# Patient Record
Sex: MALE | Race: WHITE | NOT HISPANIC OR LATINO
[De-identification: names, ages, dates, MRNs, and addresses within clinical notes are randomized per-mention and may not be internally consistent; named-entity substitution may affect disease eponyms.]

---

## 2017-01-04 ENCOUNTER — APPOINTMENT (OUTPATIENT)
Dept: SPEECH THERAPY | Facility: CLINIC | Age: 72
End: 2017-01-04

## 2017-01-04 ENCOUNTER — OUTPATIENT (OUTPATIENT)
Dept: OUTPATIENT SERVICES | Facility: HOSPITAL | Age: 72
LOS: 1 days | Discharge: HOME | End: 2017-01-04

## 2017-01-04 DIAGNOSIS — Z95.5 PRESENCE OF CORONARY ANGIOPLASTY IMPLANT AND GRAFT: Chronic | ICD-10-CM

## 2017-01-09 DIAGNOSIS — H90.3 SENSORINEURAL HEARING LOSS, BILATERAL: ICD-10-CM

## 2017-01-23 ENCOUNTER — APPOINTMENT (OUTPATIENT)
Dept: PHARMACY | Facility: CLINIC | Age: 72
End: 2017-01-23

## 2017-01-30 ENCOUNTER — APPOINTMENT (OUTPATIENT)
Dept: PHARMACY | Facility: CLINIC | Age: 72
End: 2017-01-30

## 2021-12-18 ENCOUNTER — EMERGENCY (EMERGENCY)
Facility: HOSPITAL | Age: 76
LOS: 1 days | Discharge: HOME | End: 2021-12-18
Attending: EMERGENCY MEDICINE | Admitting: EMERGENCY MEDICINE
Payer: MEDICARE

## 2021-12-18 VITALS
RESPIRATION RATE: 16 BRPM | SYSTOLIC BLOOD PRESSURE: 124 MMHG | TEMPERATURE: 98 F | DIASTOLIC BLOOD PRESSURE: 75 MMHG | HEART RATE: 65 BPM | OXYGEN SATURATION: 98 %

## 2021-12-18 VITALS
HEART RATE: 71 BPM | HEIGHT: 68 IN | TEMPERATURE: 98 F | RESPIRATION RATE: 18 BRPM | WEIGHT: 175.05 LBS | SYSTOLIC BLOOD PRESSURE: 118 MMHG | OXYGEN SATURATION: 96 % | DIASTOLIC BLOOD PRESSURE: 70 MMHG

## 2021-12-18 DIAGNOSIS — Z95.5 PRESENCE OF CORONARY ANGIOPLASTY IMPLANT AND GRAFT: Chronic | ICD-10-CM

## 2021-12-18 LAB
ALBUMIN SERPL ELPH-MCNC: 3.8 G/DL — SIGNIFICANT CHANGE UP (ref 3.3–5)
ALP SERPL-CCNC: 59 U/L — SIGNIFICANT CHANGE UP (ref 40–120)
ALT FLD-CCNC: 34 U/L — SIGNIFICANT CHANGE UP (ref 12–78)
ANION GAP SERPL CALC-SCNC: 5 MMOL/L — SIGNIFICANT CHANGE UP (ref 5–17)
APPEARANCE UR: CLEAR — SIGNIFICANT CHANGE UP
AST SERPL-CCNC: 24 U/L — SIGNIFICANT CHANGE UP (ref 15–37)
BASOPHILS # BLD AUTO: 0.06 K/UL — SIGNIFICANT CHANGE UP (ref 0–0.2)
BASOPHILS NFR BLD AUTO: 0.8 % — SIGNIFICANT CHANGE UP (ref 0–2)
BILIRUB SERPL-MCNC: 0.6 MG/DL — SIGNIFICANT CHANGE UP (ref 0.2–1.2)
BILIRUB UR-MCNC: NEGATIVE — SIGNIFICANT CHANGE UP
BUN SERPL-MCNC: 24 MG/DL — HIGH (ref 7–23)
CALCIUM SERPL-MCNC: 9.1 MG/DL — SIGNIFICANT CHANGE UP (ref 8.5–10.1)
CHLORIDE SERPL-SCNC: 109 MMOL/L — HIGH (ref 96–108)
CO2 SERPL-SCNC: 26 MMOL/L — SIGNIFICANT CHANGE UP (ref 22–31)
COLOR SPEC: YELLOW — SIGNIFICANT CHANGE UP
CREAT SERPL-MCNC: 1.1 MG/DL — SIGNIFICANT CHANGE UP (ref 0.5–1.3)
DIFF PNL FLD: ABNORMAL
EOSINOPHIL # BLD AUTO: 0.25 K/UL — SIGNIFICANT CHANGE UP (ref 0–0.5)
EOSINOPHIL NFR BLD AUTO: 3.3 % — SIGNIFICANT CHANGE UP (ref 0–6)
GLUCOSE SERPL-MCNC: 145 MG/DL — HIGH (ref 70–99)
GLUCOSE UR QL: NEGATIVE — SIGNIFICANT CHANGE UP
HCT VFR BLD CALC: 40.2 % — SIGNIFICANT CHANGE UP (ref 39–50)
HGB BLD-MCNC: 14.4 G/DL — SIGNIFICANT CHANGE UP (ref 13–17)
IMM GRANULOCYTES NFR BLD AUTO: 0.9 % — SIGNIFICANT CHANGE UP (ref 0–1.5)
KETONES UR-MCNC: NEGATIVE — SIGNIFICANT CHANGE UP
LEUKOCYTE ESTERASE UR-ACNC: ABNORMAL
LIDOCAIN IGE QN: 211 U/L — SIGNIFICANT CHANGE UP (ref 73–393)
LYMPHOCYTES # BLD AUTO: 1.9 K/UL — SIGNIFICANT CHANGE UP (ref 1–3.3)
LYMPHOCYTES # BLD AUTO: 25.1 % — SIGNIFICANT CHANGE UP (ref 13–44)
MCHC RBC-ENTMCNC: 33.2 PG — SIGNIFICANT CHANGE UP (ref 27–34)
MCHC RBC-ENTMCNC: 35.8 GM/DL — SIGNIFICANT CHANGE UP (ref 32–36)
MCV RBC AUTO: 92.6 FL — SIGNIFICANT CHANGE UP (ref 80–100)
MONOCYTES # BLD AUTO: 0.71 K/UL — SIGNIFICANT CHANGE UP (ref 0–0.9)
MONOCYTES NFR BLD AUTO: 9.4 % — SIGNIFICANT CHANGE UP (ref 2–14)
NEUTROPHILS # BLD AUTO: 4.58 K/UL — SIGNIFICANT CHANGE UP (ref 1.8–7.4)
NEUTROPHILS NFR BLD AUTO: 60.5 % — SIGNIFICANT CHANGE UP (ref 43–77)
NITRITE UR-MCNC: NEGATIVE — SIGNIFICANT CHANGE UP
NRBC # BLD: 0 /100 WBCS — SIGNIFICANT CHANGE UP (ref 0–0)
PH UR: 5 — SIGNIFICANT CHANGE UP (ref 5–8)
PLATELET # BLD AUTO: 157 K/UL — SIGNIFICANT CHANGE UP (ref 150–400)
POTASSIUM SERPL-MCNC: 3.9 MMOL/L — SIGNIFICANT CHANGE UP (ref 3.5–5.3)
POTASSIUM SERPL-SCNC: 3.9 MMOL/L — SIGNIFICANT CHANGE UP (ref 3.5–5.3)
PROT SERPL-MCNC: 7.4 G/DL — SIGNIFICANT CHANGE UP (ref 6–8.3)
PROT UR-MCNC: 15
RBC # BLD: 4.34 M/UL — SIGNIFICANT CHANGE UP (ref 4.2–5.8)
RBC # FLD: 12.8 % — SIGNIFICANT CHANGE UP (ref 10.3–14.5)
SODIUM SERPL-SCNC: 140 MMOL/L — SIGNIFICANT CHANGE UP (ref 135–145)
SP GR SPEC: 1.02 — SIGNIFICANT CHANGE UP (ref 1.01–1.02)
UROBILINOGEN FLD QL: NEGATIVE — SIGNIFICANT CHANGE UP
WBC # BLD: 7.57 K/UL — SIGNIFICANT CHANGE UP (ref 3.8–10.5)
WBC # FLD AUTO: 7.57 K/UL — SIGNIFICANT CHANGE UP (ref 3.8–10.5)

## 2021-12-18 PROCEDURE — 81001 URINALYSIS AUTO W/SCOPE: CPT

## 2021-12-18 PROCEDURE — 99284 EMERGENCY DEPT VISIT MOD MDM: CPT

## 2021-12-18 PROCEDURE — 83690 ASSAY OF LIPASE: CPT

## 2021-12-18 PROCEDURE — 85025 COMPLETE CBC W/AUTO DIFF WBC: CPT

## 2021-12-18 PROCEDURE — 74176 CT ABD & PELVIS W/O CONTRAST: CPT | Mod: 26,MA

## 2021-12-18 PROCEDURE — 74176 CT ABD & PELVIS W/O CONTRAST: CPT | Mod: MA

## 2021-12-18 PROCEDURE — 99284 EMERGENCY DEPT VISIT MOD MDM: CPT | Mod: 25

## 2021-12-18 PROCEDURE — 80053 COMPREHEN METABOLIC PANEL: CPT

## 2021-12-18 PROCEDURE — 36415 COLL VENOUS BLD VENIPUNCTURE: CPT

## 2021-12-18 NOTE — ED PROVIDER NOTE - PHYSICAL EXAMINATION

## 2021-12-18 NOTE — ED PROVIDER NOTE - PATIENT PORTAL LINK FT
You can access the FollowMyHealth Patient Portal offered by [facility] by registering at the following website: [email or web address]. By joining [email or web address]’s FollowMyHealth portal, you will also be able to view your health information using other applications (apps) compatible with our system.

## 2021-12-18 NOTE — ED PROVIDER NOTE - NSICDXPASTMEDICALHX_GEN_ALL_CORE_FT
PAST MEDICAL HISTORY:  CAD (coronary artery disease)     Hypertension     Stented coronary artery

## 2021-12-18 NOTE — ED PROVIDER NOTE - CLINICAL SUMMARY MEDICAL DECISION MAKING FREE TEXT BOX
sent by urgent care due to right flank pain x 1 day. Pt took two Advil PTA. pt was advised to come for a CT scan to rule out kidney stones. plan includes labs, UA r/o UTI, CT renal stone hunt, re-assess

## 2021-12-18 NOTE — ED PROVIDER NOTE - CARE PROVIDER_API CALL
Srinivas Lee  UROLOGY  [address] [facility] [address], [address]  [place] [address]  Phone: [phone]  Fax: [phone]  Follow Up Time: 1-3 Days    Kyrie Villalobos  Orthopedics  [address], [address] [place] [address]  Phone: [phone]  Fax: [phone]  Follow Up Time: 1-3 Days

## 2021-12-18 NOTE — ED PROVIDER NOTE - OBJECTIVE STATEMENT
77 y/o male with PMHx HTN, HLD, and CAD sent by urgent care due to right flank pain x 1 day. pt describes pain as aching, right lower abdomen radiating around flank, and currently 6/10. Reports acute onset of pain, was 8/10 prior. Pt took two Advil PTA. pt was advised to come for a CT scan to rule out kidney stones. pt denies fever, vomiting, trauma/fall, incontinence, numbness/weakness, dysuria, hematuria, cp, sob, or any other complaints.   PCP Mehnaz

## 2021-12-18 NOTE — ED PROVIDER NOTE - PROVIDER TOKENS
PROVIDER:[TOKEN:[[id number]],FOLLOWUP:[1-3 Days]],PROVIDER:[TOKEN:[[id number]],FOLLOWUP:[1-3 Days]]

## 2021-12-18 NOTE — ED PROVIDER NOTE - NSFOLLOWUPINSTRUCTIONS_ED_ALL_ED_FT
Follow up with your primary care doctor. Return for any worsening pain, fever, vomiting, bloody urine, chest pain, etc.     Flank Pain, Adult    Flank pain is pain that is located on the side of the body between the upper abdomen and the back. This area is called the flank. The pain may occur over a short period of time (acute), or it may be long-term or recurring (chronic). It may be mild or severe. Flank pain can be caused by many things, including:  •Muscle soreness or injury.      •Kidney stones or kidney disease.      •Stress.      •A disease of the spine (vertebral disk disease).      •A lung infection (pneumonia).      •Fluid around the lungs (pulmonary edema).      •A skin rash caused by the chickenpox virus (shingles).      •Tumors that affect the back of the abdomen.      •Gallbladder disease.        Follow these instructions at home:     •Drink enough fluid to keep your urine clear or pale yellow.      •Rest as told by your health care provider.      •Take over-the-counter and prescription medicines only as told by your health care provider.      •Keep a journal to track what has caused your flank pain and what has made it feel better.      •Keep all follow-up visits as told by your health care provider. This is important.        Contact a health care provider if:    •Your pain is not controlled with medicine.      •You have new symptoms.      •Your pain gets worse.      •You have a fever.      •Your symptoms last longer than 2–3 days.      •You have trouble urinating or you are urinating very frequently.        Get help right away if:    •You have trouble breathing or you are short of breath.      •Your abdomen hurts or it is swollen or red.      •You have nausea or vomiting.      •You feel faint or you pass out.      •You have blood in your urine.        Summary    •Flank pain is pain that is located on the side of the body between the upper abdomen and the back.      •The pain may occur over a short period of time (acute), or it may be long-term or recurring (chronic). It may be mild or severe.      •Flank pain can be caused by many things.      •Contact your health care provider if your symptoms get worse or they last longer than 2–3 days.      This information is not intended to replace advice given to you by your health care provider. Make sure you discuss any questions you have with your health care provider.

## 2021-12-18 NOTE — ED PROVIDER NOTE - ATTENDING CONTRIBUTION TO CARE
76 male sent to ER from urgent care states this morning he had severe right flank pain radiating to right groin, no vomiting, could not find comfortable position, took 2 advil with some relief of pain, able to urinate well, no hematuria, no fever, no vomiting, patient alert and oriented, no acute distress, able to ambulate with out assistance, abdomen soft, non tender, f/u labs, ua, ct abdomen/pelvis, re-eval.

## 2023-02-01 ENCOUNTER — APPOINTMENT (OUTPATIENT)
Dept: ORTHOPEDIC SURGERY | Facility: CLINIC | Age: 78
End: 2023-02-01
Payer: MEDICARE

## 2023-02-01 VITALS — BODY MASS INDEX: 28.09 KG/M2 | WEIGHT: 179 LBS | HEIGHT: 67 IN

## 2023-02-01 DIAGNOSIS — Z78.9 OTHER SPECIFIED HEALTH STATUS: ICD-10-CM

## 2023-02-01 DIAGNOSIS — Z86.79 PERSONAL HISTORY OF OTHER DISEASES OF THE CIRCULATORY SYSTEM: ICD-10-CM

## 2023-02-01 DIAGNOSIS — Z86.39 PERSONAL HISTORY OF OTHER ENDOCRINE, NUTRITIONAL AND METABOLIC DISEASE: ICD-10-CM

## 2023-02-01 PROCEDURE — 99214 OFFICE O/P EST MOD 30 MIN: CPT

## 2023-02-01 PROCEDURE — 73562 X-RAY EXAM OF KNEE 3: CPT | Mod: LT

## 2023-02-01 NOTE — HISTORY OF PRESENT ILLNESS
[Gradual] : gradual [8] : 8 [Dull/Aching] : dull/aching [Intermittent] : intermittent [Walking] : walking [Lying in bed] : lying in bed [Retired] : Work status: retired [de-identified] : The patient has had increased pain left knee over the past several weeks.  He says this started after he had a vascular evaluation which was normal.  He has mild to moderate pain standing and walking.  Pain with stairs and movie sign.  He did have good improvement after previous Euflexxa injections left knee in 2021 [] : Post Surgical Visit: no [FreeTextEntry7] : L Leg [de-identified] : 11/4/21 [de-identified] : Dr. Arroyo

## 2023-02-01 NOTE — DISCUSSION/SUMMARY
[de-identified] : The patient would like to repeat Euflexxa injections left knee.  Risks benefits and the alternatives were discussed\par Ice p.r.n.\par Tylenol p.r.n.\par Continue home exercises\par \par Impression:\par Moderate to severe osteoarthritis left knee

## 2023-02-01 NOTE — IMAGING
[de-identified] : Mild left antalgic gait\par \par Left knee\par No swelling\par Mild medial facet and joint line tenderness\par Passive range of motion 0° to 125°\par Ligaments are stable\par Quad strength 5/5\par \par Left leg\par No swelling\par Calf is soft and nontender\par Dorsalis pedis pulse 2+ [Left] : left knee [FreeTextEntry9] : Reviewed and interpreted.  Left knee AP standing, lateral, sunrise views-moderate to severe degenerative changes with narrowing the medial compartment on AP standing view, spurring patellofemoral joint

## 2023-02-08 ENCOUNTER — APPOINTMENT (OUTPATIENT)
Dept: ORTHOPEDIC SURGERY | Facility: CLINIC | Age: 78
End: 2023-02-08
Payer: MEDICARE

## 2023-02-08 VITALS — BODY MASS INDEX: 28.09 KG/M2 | HEIGHT: 67 IN | WEIGHT: 179 LBS

## 2023-02-08 PROCEDURE — 20611 DRAIN/INJ JOINT/BURSA W/US: CPT | Mod: LT

## 2023-02-08 RX ORDER — HYALURONATE SODIUM 20 MG/2 ML
20 SYRINGE (ML) INTRAARTICULAR
Refills: 0 | Status: COMPLETED | OUTPATIENT
Start: 2023-02-08

## 2023-02-08 RX ADMIN — Medication MG/2ML: at 00:00

## 2023-02-08 NOTE — HISTORY OF PRESENT ILLNESS
[Gradual] : gradual [3] : 3 [Dull/Aching] : dull/aching [Intermittent] : intermittent [Leisure] : leisure [Rest] : rest [Walking] : walking [Stairs] : stairs [Retired] : Work status: retired [1] : 1 [Euflexxa] : Euflexxa [de-identified] : The patient has continued mild to moderate pain left knee standing and walking.  Pain after sitting and getting up.  Seen today with his wife, Amaya [] : Post Surgical Visit: no

## 2023-02-08 NOTE — IMAGING
[de-identified] : Mild left antalgic gait\par \par Left knee\par No swelling\par Mild medial facet and joint line tenderness\par Passive range of motion 0° to 125°\par \par Left leg\par No swelling\par Calf is soft and nontender\par

## 2023-02-08 NOTE — DISCUSSION/SUMMARY
[de-identified] : The patient would like to repeat Euflexxa injections left knee.  Risks benefits and the alternatives were discussed\par Ice p.r.n.\par Tylenol p.r.n.\par Continue home exercises\par \par Impression:\par Moderate to severe osteoarthritis left knee

## 2023-02-08 NOTE — PROCEDURE
[Large Joint Injection] : Large joint injection [Left] : of the left [Knee] : knee [Pain] : pain [Alcohol] : alcohol [Betadine] : betadine [Ethyl Chloride sprayed topically] : ethyl chloride sprayed topically [Sterile technique used] : sterile technique used [Euflexxa(20mg)] : 20mg of Euflexxa [#1] : series #1 [] : Patient tolerated procedure well [Patient was advised to rest the joint(s) for ____ days] : patient was advised to rest the joint(s) for [unfilled] days [Risks, benefits, alternatives discussed / Verbal consent obtained] : the risks benefits, and alternatives have been discussed, and verbal consent was obtained [Altered anatomic landmarks d/t erosive arthritis] : altered anatomic landmarks d/t erosive arthritis [All ultrasound images have been permanently captured and stored accordingly in our picture archiving and communication system] : All ultrasound images have been permanently captured and stored accordingly in our picture archiving and communication system [FreeTextEntry3] : Do not submerge underwater for 24 hours

## 2023-02-13 ENCOUNTER — APPOINTMENT (OUTPATIENT)
Dept: ORTHOPEDIC SURGERY | Facility: CLINIC | Age: 78
End: 2023-02-13
Payer: MEDICARE

## 2023-02-13 VITALS — HEIGHT: 67 IN | WEIGHT: 179 LBS | BODY MASS INDEX: 28.09 KG/M2

## 2023-02-13 DIAGNOSIS — S16.1XXA STRAIN OF MUSCLE, FASCIA AND TENDON AT NECK LEVEL, INITIAL ENCOUNTER: ICD-10-CM

## 2023-02-13 PROCEDURE — 72040 X-RAY EXAM NECK SPINE 2-3 VW: CPT

## 2023-02-13 PROCEDURE — 73030 X-RAY EXAM OF SHOULDER: CPT | Mod: RT

## 2023-02-13 PROCEDURE — 99214 OFFICE O/P EST MOD 30 MIN: CPT

## 2023-02-13 RX ORDER — ASPIRIN 81 MG/1
81 TABLET ORAL
Refills: 0 | Status: ACTIVE | COMMUNITY

## 2023-02-13 NOTE — HISTORY OF PRESENT ILLNESS
[Neck] : neck [Right Arm] : right arm [Gradual] : gradual [8] : 8 [Radiating] : radiating [Sharp] : sharp [Constant] : constant [Household chores] : household chores [Leisure] : leisure [Sleep] : sleep [Rest] : rest [Exercising] : exercising [Retired] : Work status: retired [de-identified] : Patient is a 77-year-old right-hand-dominant male with pain in his neck and right shoulder radiating down his right arm since November 2022.  No injury.  He has mild to moderate pain with neck movement.\par He has mild to moderate pain right shoulder reaching above him and behind him.  Occasional awakening from sleep\par He has numbness and tingling in his hand.  He had seen a hand specialist Dr. Cyndie Olivier who had recommended EMG and nerve conduction studies upper extremities.  He is seeing Dr. Jeaneth Freeman today.  Taking Tylenol p.r.n. [] : Post Surgical Visit: no [FreeTextEntry7] : R Arm

## 2023-02-13 NOTE — DISCUSSION/SUMMARY
[de-identified] : The patient will have MRIs cervical spine and right shoulder\par He is having EMGs and nerve conduction studies of her upper extremities today with Dr. Jeaneth Freeman\par Continue hand followup with Dr. 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

## 2023-02-13 NOTE — IMAGING
[Right] : right shoulder [de-identified] : Cervical spine\par Inspection normal\par Mild tenderness trapezius on the right\par Mildly decreased active range of motion\par Negative foraminal closing test\par Motor exam upper extremities-right supraspinatus strength 4+/5\par \par Right shoulder\par No swelling\par Mild tenderness anterior subacromial region\par Active forward flexion 170°, external rotation 60°, internal rotation lower lumbar region\par Passive forward flexion 170°.  Mild to moderate pain at end of range\par Positive impingement 160°\par Radial pulse 2+ [FreeTextEntry1] : Reviewed and interpreted.  Right shoulder external rotation and outlet views-Type I-II acromion.  Mild degenerative changes of the glenohumeral joint

## 2023-02-15 ENCOUNTER — APPOINTMENT (OUTPATIENT)
Dept: ORTHOPEDIC SURGERY | Facility: CLINIC | Age: 78
End: 2023-02-15
Payer: MEDICARE

## 2023-02-15 VITALS — HEIGHT: 67 IN | BODY MASS INDEX: 28.09 KG/M2 | WEIGHT: 179 LBS

## 2023-02-15 PROCEDURE — 99213 OFFICE O/P EST LOW 20 MIN: CPT | Mod: 25

## 2023-02-15 PROCEDURE — 20611 DRAIN/INJ JOINT/BURSA W/US: CPT

## 2023-02-15 RX ORDER — HYALURONATE SODIUM 20 MG/2 ML
20 SYRINGE (ML) INTRAARTICULAR
Refills: 0 | Status: COMPLETED | OUTPATIENT
Start: 2023-02-15

## 2023-02-15 RX ADMIN — Medication MG/2ML: at 00:00

## 2023-02-15 NOTE — PROCEDURE
[Large Joint Injection] : Large joint injection [Left] : of the left [Knee] : knee [Pain] : pain [Alcohol] : alcohol [Betadine] : betadine [Ethyl Chloride sprayed topically] : ethyl chloride sprayed topically [Sterile technique used] : sterile technique used [Euflexxa(20mg)] : 20mg of Euflexxa [#2] : series #2 [] : Patient tolerated procedure well [Patient was advised to rest the joint(s) for ____ days] : patient was advised to rest the joint(s) for [unfilled] days [Risks, benefits, alternatives discussed / Verbal consent obtained] : the risks benefits, and alternatives have been discussed, and verbal consent was obtained [Altered anatomic landmarks d/t erosive arthritis] : altered anatomic landmarks d/t erosive arthritis [All ultrasound images have been permanently captured and stored accordingly in our picture archiving and communication system] : All ultrasound images have been permanently captured and stored accordingly in our picture archiving and communication system [FreeTextEntry3] : Do not submerge underwater for 24 hours

## 2023-02-15 NOTE — HISTORY OF PRESENT ILLNESS
[Gradual] : gradual [3] : 3 [Dull/Aching] : dull/aching [Intermittent] : intermittent [Leisure] : leisure [Rest] : rest [Stairs] : stairs [Retired] : Work status: retired [1] : 1 [Euflexxa] : Euflexxa [de-identified] : The patient feels a little better after the first Euflexxa injection left knee.  He has mild to moderate pain standing and walking\par He had neurology consult with Dr. Freeman.  He had EMG and nerve conduction studies the upper extremities [] : Post Surgical Visit: no [de-identified] : 2/13/2023 [de-identified] : Dr. Arroyo

## 2023-02-15 NOTE — DATA REVIEWED
[EMG Nerve Conduction] : A EMG Nerve Conduction test was completed of the [Bilateral] : bilateral [Upper extremity] : upper extremity [FreeTextEntry1] : EMG and nerve conduction studies upper extremities done by Dr. Freeman February 13, 2023 reported as right moderate subacute C6-7 radiculopathy. Moderate right carpal tunnel syndrome. Mild ulnar nerve neuropathy right elbow. \par

## 2023-02-15 NOTE — DISCUSSION/SUMMARY
[de-identified] : EMG and nerve conduction studies of the upper extremities were discussed with the patient\par He has MRI of the cervical spine and right shoulder scheduled tomorrow at Dr. Kaplan\par Ice p.r.n.\par Tylenol p.r.n.\par Continue home exercises\par He will schedule followup with Dr. 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

## 2023-02-15 NOTE — IMAGING
[de-identified] : Mild left antalgic gait\par \par Left knee\par No swelling\par Mild medial facet and joint line tenderness\par Passive range of motion 0° to 125°\par \par Left leg\par No swelling\par Calf is soft and nontender\par

## 2023-03-01 ENCOUNTER — APPOINTMENT (OUTPATIENT)
Dept: ORTHOPEDIC SURGERY | Facility: CLINIC | Age: 78
End: 2023-03-01
Payer: MEDICARE

## 2023-03-01 VITALS — BODY MASS INDEX: 28.09 KG/M2 | WEIGHT: 179 LBS | HEIGHT: 67 IN

## 2023-03-01 DIAGNOSIS — R93.7 ABNORMAL FINDINGS ON DIAGNOSTIC IMAGING OF OTHER PARTS OF MUSCULOSKELETAL SYSTEM: ICD-10-CM

## 2023-03-01 DIAGNOSIS — R93.6 ABNORMAL FINDINGS ON DIAGNOSTIC IMAGING OF LIMBS: ICD-10-CM

## 2023-03-01 PROCEDURE — 20611 DRAIN/INJ JOINT/BURSA W/US: CPT

## 2023-03-01 PROCEDURE — 99214 OFFICE O/P EST MOD 30 MIN: CPT | Mod: 25

## 2023-03-01 RX ORDER — HYALURONATE SODIUM 20 MG/2 ML
20 SYRINGE (ML) INTRAARTICULAR
Refills: 0 | Status: COMPLETED | OUTPATIENT
Start: 2023-03-01

## 2023-03-01 RX ADMIN — Medication MG/2ML: at 00:00

## 2023-03-01 NOTE — PROCEDURE
[Large Joint Injection] : Large joint injection [Left] : of the left [Knee] : knee [Pain] : pain [Alcohol] : alcohol [Betadine] : betadine [Ethyl Chloride sprayed topically] : ethyl chloride sprayed topically [Sterile technique used] : sterile technique used [Euflexxa(20mg)] : 20mg of Euflexxa [#3] : series #3 [] : Patient tolerated procedure well [Patient was advised to rest the joint(s) for ____ days] : patient was advised to rest the joint(s) for [unfilled] days [Risks, benefits, alternatives discussed / Verbal consent obtained] : the risks benefits, and alternatives have been discussed, and verbal consent was obtained [Altered anatomic landmarks d/t erosive arthritis] : altered anatomic landmarks d/t erosive arthritis [All ultrasound images have been permanently captured and stored accordingly in our picture archiving and communication system] : All ultrasound images have been permanently captured and stored accordingly in our picture archiving and communication system [FreeTextEntry3] : Do not submerge underwater for 24 hours

## 2023-03-01 NOTE — DATA REVIEWED
[MRI] : MRI [Right] : of the right [Shoulder] : shoulder [Cervical Spine] : cervical spine [I independently reviewed and interpreted images and report] : I independently reviewed and interpreted images and report [FreeTextEntry1] : MRI cervical spine done February 21, 2023 at [facility] reported as mild bulge/osteophyte C3-4. Small bulges at C4-5, C5-6 and C6-7.\par Also reported as within superficial lobe left parotid gland is a 1 cm T2 hyperintense signal focus incompletely evaluated on these exam images. Possible intraparotid node, pleomorphic adenoma or other etiology not excluded. No significant change in appearance in comparison to January 13, 2020 MRI exam\par On review, there is mild broad left paracentral disc/osteophyte at C3-4.  Multilevel degenerative disc disease\par \par MRI right shoulder done at [facility] February 22, 2023 was reported as mild supraspinatus tendinosis without tear. Mild subacromial bursitis. Mild glenohumeral and moderate AC osteoarthritis. Additional nonspecific findings which may be seen in setting of adhesive capsulitis\par On review, there are mild degenerative changes of the glenohumeral joint.  Moderate degenerative changes of the AC joint.  Mild to moderate increase signal changes supraspinatus tendon.  The rotator cuff is intact

## 2023-03-01 NOTE — DISCUSSION/SUMMARY
[de-identified] : MRI results cervical spine and right shoulder were discussed with the patient\par Ice p.r.n.\par Tylenol p.r.n.\par Continue home exercises\par He will schedule followup with Dr. Cyndie Olivier\par I discussed possible cortisone injection left knee if ongoing symptoms\par I discussed possible Euflexxa injections right shoulder if ongoing symptoms\par He will have ENT consult with Dr. 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

## 2023-03-01 NOTE — IMAGING
[de-identified] : Mild left antalgic gait\par \par Cervical spine\par Inspection normal\par Mild tenderness trapezius on the right\par Mildly decreased active range of motion\par Negative foraminal closing test\par Motor exam upper extremities-right supraspinatus strength 5-/5\par \par Right shoulder\par No swelling\par No tenderness\par Active forward flexion 170°, external rotation 60°, internal rotation lower lumbar region\par Passive forward flexion 170°. Mild  pain at end of range\par Positive impingement 160°\par Radial pulse 2+ \par \par \par Left knee\par No swelling\par Mild medial facet and joint line tenderness\par Passive range of motion 0° to 125°\par \par Left leg\par No swelling\par Calf is soft and nontender\par

## 2023-03-01 NOTE — HISTORY OF PRESENT ILLNESS
[Gradual] : gradual [4] : 4 [Dull/Aching] : dull/aching [Intermittent] : intermittent [Household chores] : household chores [Leisure] : leisure [Rest] : rest [Stairs] : stairs [Retired] : Work status: retired [3] : 3 [Euflexxa] : Euflexxa [de-identified] : The patient says his neck and right shoulder feeling better since last visit.  He has mild pain in his neck with movement.  His neck feels sore.  No radicular complaints at the present time\par He has mild pain right shoulder reaching above him and behind him.  No awakening from sleep at night.  Mild stiffness\par He had MRIs of his neck and right shoulder\par He had cardiac stress test yesterday.  He says his left knee is feeling worse.  He has mild to moderate pain standing and walking.  Pain after sitting and getting up.  No change after the second Euflexxa injection.  Seen today with his wife, Amaya [] : Post Surgical Visit: no [de-identified] : 2/15/2023 [de-identified] : Dr. Arroyo  [de-identified] : 2/15/2023

## 2023-03-20 ENCOUNTER — APPOINTMENT (OUTPATIENT)
Dept: ORTHOPEDIC SURGERY | Facility: CLINIC | Age: 78
End: 2023-03-20
Payer: MEDICARE

## 2023-03-20 VITALS — HEIGHT: 67 IN | BODY MASS INDEX: 28.09 KG/M2 | WEIGHT: 179 LBS

## 2023-03-20 DIAGNOSIS — M75.01 ADHESIVE CAPSULITIS OF RIGHT SHOULDER: ICD-10-CM

## 2023-03-20 DIAGNOSIS — M75.51 BURSITIS OF RIGHT SHOULDER: ICD-10-CM

## 2023-03-20 DIAGNOSIS — M19.011 PRIMARY OSTEOARTHRITIS, RIGHT SHOULDER: ICD-10-CM

## 2023-03-20 DIAGNOSIS — S16.1XXD STRAIN OF MUSCLE, FASCIA AND TENDON AT NECK LEVEL, SUBSEQUENT ENCOUNTER: ICD-10-CM

## 2023-03-20 DIAGNOSIS — M47.812 SPONDYLOSIS W/OUT MYELOPATHY OR RADICULOPATHY, CERVICAL REGION: ICD-10-CM

## 2023-03-20 PROCEDURE — 99214 OFFICE O/P EST MOD 30 MIN: CPT

## 2023-03-20 NOTE — HISTORY OF PRESENT ILLNESS
[Gradual] : gradual [6] : 6 [Dull/Aching] : dull/aching [Intermittent] : intermittent [Injection therapy] : injection therapy [Sitting] : sitting [Retired] : Work status: retired [de-identified] : The patient has mild occasional pain in his neck and right shoulder with movement.  No awakening from sleep at night.  No stiffness.  No radicular complaints\par He has mild pain left knee standing and walking.  Pain with stairs.  He does feel better after completing Euflexxa injections.\par Seen today with his wife, Amaya [] : Post Surgical Visit: no [de-identified] : 03/01/23 [de-identified] : 03/01/23 [de-identified] : Dr. Arroyo

## 2023-03-20 NOTE — IMAGING
[de-identified] : Mild left antalgic gait\par \par Cervical spine\par Inspection normal\par Slight tenderness trapezius on the right\par Mildly decreased active range of motion\par Negative foraminal closing test\par Motor exam upper extremities-right supraspinatus strength 5-/5\par \par Right shoulder\par No swelling\par No tenderness\par Active forward flexion 170°, external rotation 60°, internal rotation lower lumbar region\par Passive forward flexion 170°.  Slight pain at end of range\par Positive impingement 160°\par Radial pulse 2+ \par \par \par Left knee\par No swelling\par Mild medial facet and joint line tenderness\par Passive range of motion 0° to 125°\par Ligaments are stable\par Quad strength 5/5\par \par Left leg\par No swelling\par Calf is soft and nontender\par

## 2023-03-20 NOTE — DISCUSSION/SUMMARY
[de-identified] : Ice p.r.n.\par Tylenol p.r.n.\par Continue home exercises\par Continue stretching right shoulder\par He will schedule followup with Dr. Cyndie Olivier\par He had ENT consult with Dr. Garcia to evaluate left parotid lesion noted on MRI cervical spine.  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

## 2023-04-03 ENCOUNTER — NON-APPOINTMENT (OUTPATIENT)
Age: 78
End: 2023-04-03

## 2023-08-02 ENCOUNTER — APPOINTMENT (OUTPATIENT)
Dept: ORTHOPEDIC SURGERY | Facility: CLINIC | Age: 78
End: 2023-08-02
Payer: MEDICARE

## 2023-08-02 DIAGNOSIS — M17.11 UNILATERAL PRIMARY OSTEOARTHRITIS, RIGHT KNEE: ICD-10-CM

## 2023-08-02 DIAGNOSIS — M17.12 UNILATERAL PRIMARY OSTEOARTHRITIS, LEFT KNEE: ICD-10-CM

## 2023-08-02 PROCEDURE — 99214 OFFICE O/P EST MOD 30 MIN: CPT

## 2023-08-02 PROCEDURE — 73562 X-RAY EXAM OF KNEE 3: CPT | Mod: RT

## 2023-08-02 NOTE — IMAGING
[Right] : right knee [de-identified] : Normal gait  Right knee No swelling Mild medial facet tenderness Passive range of motion 0 degrees to 125 degrees Ligaments are stable Quad strength 5 -/5  Left knee No swelling Mild medial facet and joint line tenderness Passive range of motion 0 degrees to 125 degrees Ligaments are stable Quad strength 5 -/5  Both legs No swelling Calves are soft and nontender Posterior tibial pulse 2+ bilaterally [FreeTextEntry9] : Reviewed and interpreted.  Right knee AP standing, lateral, sunrise views-mild to moderate degenerative changes with narrowing of the medial compartment on AP standing view.  Small area of heterotopic ossification posterior lateral proximal leg

## 2023-08-02 NOTE — DISCUSSION/SUMMARY
[de-identified] : Ice p.r.n. Tylenol or Advil p.r.n. Continue home exercises He would like to repeat Euflexxa injections in both knees.  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

## 2023-08-02 NOTE — REASON FOR VISIT
[FreeTextEntry2] : Increased pain left knee over the past several weeks Recurrence of pain right knee over the past couple weeks Clofazimine Counseling:  I discussed with the patient the risks of clofazimine including but not limited to skin and eye pigmentation, liver damage, nausea/vomiting, gastrointestinal bleeding and allergy.

## 2023-08-02 NOTE — HISTORY OF PRESENT ILLNESS
[Dull/Aching] : dull/aching [Intermittent] : intermittent [Injection therapy] : injection therapy [Walking] : walking [de-identified] : The patient has had recurrence of pain right knee over the past couple of weeks.  No injury. He has had increased pain in left knee over the past few weeks. He has mild pain standing and walking.  Pain with stairs and movie sign.  Taking Advil as needed.  He did have good improvement after previous Euflexxa injections in both knees in the past.  Seen today with his wife, Amaya [] : Post Surgical Visit: no [de-identified] : 3/20/23 [de-identified] : DR. Arroyo [de-identified] : 3/1/23

## 2023-09-06 ENCOUNTER — APPOINTMENT (OUTPATIENT)
Dept: ORTHOPEDIC SURGERY | Facility: CLINIC | Age: 78
End: 2023-09-06
Payer: MEDICARE

## 2023-09-06 VITALS — BODY MASS INDEX: 28.09 KG/M2 | HEIGHT: 67 IN | WEIGHT: 179 LBS

## 2023-09-06 PROCEDURE — 20611 DRAIN/INJ JOINT/BURSA W/US: CPT | Mod: 50

## 2023-09-06 RX ORDER — HYALURONATE SODIUM 20 MG/2 ML
20 SYRINGE (ML) INTRAARTICULAR
Refills: 0 | Status: COMPLETED | OUTPATIENT
Start: 2023-09-06

## 2023-09-06 RX ADMIN — Medication MG/2ML: at 00:00

## 2023-09-06 NOTE — HISTORY OF PRESENT ILLNESS
[Gradual] : gradual [3] : 3 [Dull/Aching] : dull/aching [Intermittent] : intermittent [Leisure] : leisure [Rest] : rest [Stairs] : stairs [Retired] : Work status: retired [1] : 1 [Euflexxa] : Euflexxa [de-identified] : The patient has continued pain in both knees.  The left knee bothers him more than the right.  Mild pain standing and walking.  Mild to moderate pain after sitting and getting up.  Seen today with his wife, Amaya [] : Post Surgical Visit: no [FreeTextEntry1] : B Knees

## 2023-09-06 NOTE — DISCUSSION/SUMMARY
[de-identified] : Ice p.r.n. Tylenol or Advil p.r.n. Continue home exercises He would like to repeat Euflexxa injections in both knees.  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

## 2023-09-06 NOTE — IMAGING
[de-identified] : Normal gait  Right knee No swelling Mild medial facet tenderness Passive range of motion 0 degrees to 125 degrees  Left knee No swelling Mild medial facet and joint line tenderness Passive range of motion 0 degrees to 125 degrees  Both legs No swelling Calves are soft and nontender

## 2023-09-13 ENCOUNTER — APPOINTMENT (OUTPATIENT)
Dept: ORTHOPEDIC SURGERY | Facility: CLINIC | Age: 78
End: 2023-09-13

## 2023-09-19 ENCOUNTER — NON-APPOINTMENT (OUTPATIENT)
Age: 78
End: 2023-09-19

## 2023-09-20 ENCOUNTER — APPOINTMENT (OUTPATIENT)
Dept: ORTHOPEDIC SURGERY | Facility: CLINIC | Age: 78
End: 2023-09-20
Payer: MEDICARE

## 2023-09-20 ENCOUNTER — APPOINTMENT (OUTPATIENT)
Dept: PULMONOLOGY | Facility: CLINIC | Age: 78
End: 2023-09-20
Payer: MEDICARE

## 2023-09-20 VITALS — WEIGHT: 179 LBS | BODY MASS INDEX: 28.09 KG/M2 | HEIGHT: 67 IN

## 2023-09-20 PROCEDURE — 94010 BREATHING CAPACITY TEST: CPT

## 2023-09-20 PROCEDURE — 94729 DIFFUSING CAPACITY: CPT

## 2023-09-20 PROCEDURE — 94726 PLETHYSMOGRAPHY LUNG VOLUMES: CPT

## 2023-09-20 PROCEDURE — 20611 DRAIN/INJ JOINT/BURSA W/US: CPT | Mod: 50

## 2023-09-20 RX ORDER — HYALURONATE SODIUM 20 MG/2 ML
20 SYRINGE (ML) INTRAARTICULAR
Refills: 0 | Status: COMPLETED | OUTPATIENT
Start: 2023-09-20

## 2023-09-20 RX ADMIN — Medication MG/2ML: at 00:00

## 2023-09-21 ENCOUNTER — APPOINTMENT (OUTPATIENT)
Dept: PULMONOLOGY | Facility: CLINIC | Age: 78
End: 2023-09-21
Payer: MEDICARE

## 2023-09-21 VITALS
WEIGHT: 175.13 LBS | RESPIRATION RATE: 15 BRPM | BODY MASS INDEX: 27.49 KG/M2 | TEMPERATURE: 97.9 F | SYSTOLIC BLOOD PRESSURE: 110 MMHG | HEART RATE: 63 BPM | DIASTOLIC BLOOD PRESSURE: 68 MMHG | OXYGEN SATURATION: 96 % | HEIGHT: 67 IN

## 2023-09-21 DIAGNOSIS — R07.89 OTHER CHEST PAIN: ICD-10-CM

## 2023-09-21 PROCEDURE — 99213 OFFICE O/P EST LOW 20 MIN: CPT

## 2023-09-22 ENCOUNTER — TRANSCRIPTION ENCOUNTER (OUTPATIENT)
Age: 78
End: 2023-09-22

## 2023-09-27 ENCOUNTER — APPOINTMENT (OUTPATIENT)
Dept: ORTHOPEDIC SURGERY | Facility: CLINIC | Age: 78
End: 2023-09-27
Payer: MEDICARE

## 2023-09-27 VITALS — BODY MASS INDEX: 27.47 KG/M2 | WEIGHT: 175 LBS | HEIGHT: 67 IN

## 2023-09-27 PROCEDURE — 20611 DRAIN/INJ JOINT/BURSA W/US: CPT | Mod: 50

## 2023-09-27 PROCEDURE — 99213 OFFICE O/P EST LOW 20 MIN: CPT | Mod: 25

## 2023-09-27 RX ORDER — HYALURONATE SODIUM 20 MG/2 ML
20 SYRINGE (ML) INTRAARTICULAR
Refills: 0 | Status: COMPLETED | OUTPATIENT
Start: 2023-09-27

## 2023-09-27 RX ADMIN — Medication MG/2ML: at 00:00

## 2023-11-01 ENCOUNTER — APPOINTMENT (OUTPATIENT)
Dept: PULMONOLOGY | Facility: CLINIC | Age: 78
End: 2023-11-01

## 2023-11-24 ENCOUNTER — APPOINTMENT (OUTPATIENT)
Dept: PULMONOLOGY | Facility: CLINIC | Age: 78
End: 2023-11-24

## 2024-06-18 ENCOUNTER — APPOINTMENT (OUTPATIENT)
Dept: ORTHOPEDIC SURGERY | Facility: CLINIC | Age: 79
End: 2024-06-18
Payer: MEDICARE

## 2024-06-18 VITALS — BODY MASS INDEX: 27.47 KG/M2 | WEIGHT: 175 LBS | HEIGHT: 67 IN

## 2024-06-18 DIAGNOSIS — M22.41 CHONDROMALACIA PATELLAE, RIGHT KNEE: ICD-10-CM

## 2024-06-18 PROCEDURE — 99214 OFFICE O/P EST MOD 30 MIN: CPT

## 2024-06-18 PROCEDURE — 73562 X-RAY EXAM OF KNEE 3: CPT | Mod: 50

## 2024-06-18 NOTE — DISCUSSION/SUMMARY
[de-identified] : Ice p.r.n. Tylenol or Advil p.r.n. Continue home exercises I discussed repeat Euflexxa injections. 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

## 2024-06-18 NOTE — IMAGING
[Bilateral] : knee bilaterally [de-identified] : Normal gait  Right knee No swelling Mild medial facet tenderness Passive range of motion 0 degrees to 125 degrees Ligaments are stable Quad strength 5 /5  Left knee No swelling Mild medial facet and joint line tenderness Passive range of motion 0 degrees to 125 degrees Ligaments are stable Quad strength 5 /5  Both legs No swelling Calves are soft and nontender Posterior tibial pulse 2+ bilaterally [FreeTextEntry9] : Reviewed and interpreted.  Right knee AP standing, lateral, sunrise views-mild to moderate degenerative changes with narrowing of the medial compartment on AP standing view.  Small area of heterotopic ossification posterior lateral proximal leg  Reviewed and interpreted.  Left knee AP standing, lateral, sunrise views-moderate degenerative changes with narrowing of the medial compartment on AP standing view, spurring patellofemoral joint none

## 2024-06-18 NOTE — HISTORY OF PRESENT ILLNESS
[Dull/Aching] : dull/aching [Intermittent] : intermittent [Injection therapy] : injection therapy [Walking] : walking [de-identified] : The patient has had increased pain in both knees over the past few weeks.  The left knee bothers him more than the right. He has mild pain standing and walking.  Pain with stairs and movie sign.  Taking Advil as needed.  He did have good improvement after previous Euflexxa injections in both knees in the past.   [] : Post Surgical Visit: no [FreeTextEntry1] : B Knee  [de-identified] : 9/27/2023 [de-identified] : Dr. Arroyo [de-identified] : 9/27/2023

## 2024-07-01 ENCOUNTER — APPOINTMENT (OUTPATIENT)
Dept: ORTHOPEDIC SURGERY | Facility: CLINIC | Age: 79
End: 2024-07-01
Payer: MEDICARE

## 2024-07-01 VITALS — BODY MASS INDEX: 27.47 KG/M2 | HEIGHT: 67 IN | WEIGHT: 175 LBS

## 2024-07-01 PROBLEM — M17.0 PRIMARY OSTEOARTHRITIS OF BOTH KNEES: Status: ACTIVE | Noted: 2023-09-06

## 2024-07-01 PROCEDURE — 20611 DRAIN/INJ JOINT/BURSA W/US: CPT | Mod: 50

## 2024-07-01 RX ORDER — HYALURONATE SODIUM 20 MG/2 ML
20 SYRINGE (ML) INTRAARTICULAR
Refills: 0 | Status: COMPLETED | OUTPATIENT
Start: 2024-07-01

## 2024-07-01 RX ADMIN — Medication MG/2ML: at 00:00

## 2024-07-02 ENCOUNTER — NON-APPOINTMENT (OUTPATIENT)
Age: 79
End: 2024-07-02

## 2024-07-08 ENCOUNTER — APPOINTMENT (OUTPATIENT)
Dept: ORTHOPEDIC SURGERY | Facility: CLINIC | Age: 79
End: 2024-07-08
Payer: MEDICARE

## 2024-07-08 VITALS — WEIGHT: 175 LBS | HEIGHT: 67 IN | BODY MASS INDEX: 27.47 KG/M2

## 2024-07-08 PROCEDURE — 20611 DRAIN/INJ JOINT/BURSA W/US: CPT | Mod: 50

## 2024-07-08 RX ORDER — HYALURONATE SODIUM 20 MG/2 ML
20 SYRINGE (ML) INTRAARTICULAR
Refills: 0 | Status: COMPLETED | OUTPATIENT
Start: 2024-07-08

## 2024-07-08 RX ADMIN — Medication MG/2ML: at 00:00

## 2024-07-15 ENCOUNTER — APPOINTMENT (OUTPATIENT)
Dept: ORTHOPEDIC SURGERY | Facility: CLINIC | Age: 79
End: 2024-07-15
Payer: MEDICARE

## 2024-07-15 VITALS — HEIGHT: 67 IN | BODY MASS INDEX: 27.47 KG/M2 | WEIGHT: 175 LBS

## 2024-07-15 DIAGNOSIS — M17.0 BILATERAL PRIMARY OSTEOARTHRITIS OF KNEE: ICD-10-CM

## 2024-07-15 PROCEDURE — 20611 DRAIN/INJ JOINT/BURSA W/US: CPT | Mod: 50

## 2024-07-15 RX ORDER — HYALURONATE SODIUM 20 MG/2 ML
20 SYRINGE (ML) INTRAARTICULAR
Refills: 0 | Status: COMPLETED | OUTPATIENT
Start: 2024-07-15

## 2024-07-15 RX ADMIN — Medication MG/2ML: at 00:00

## 2024-12-16 ENCOUNTER — APPOINTMENT (OUTPATIENT)
Dept: ORTHOPEDIC SURGERY | Facility: CLINIC | Age: 79
End: 2024-12-16
Payer: MEDICARE

## 2024-12-16 VITALS — WEIGHT: 175 LBS | HEIGHT: 67 IN | BODY MASS INDEX: 27.47 KG/M2

## 2024-12-16 DIAGNOSIS — M17.0 BILATERAL PRIMARY OSTEOARTHRITIS OF KNEE: ICD-10-CM

## 2024-12-16 PROCEDURE — 99214 OFFICE O/P EST MOD 30 MIN: CPT

## 2025-01-20 ENCOUNTER — APPOINTMENT (OUTPATIENT)
Dept: ORTHOPEDIC SURGERY | Facility: CLINIC | Age: 80
End: 2025-01-20

## 2025-02-03 ENCOUNTER — APPOINTMENT (OUTPATIENT)
Dept: ORTHOPEDIC SURGERY | Facility: CLINIC | Age: 80
End: 2025-02-03
Payer: MEDICARE

## 2025-02-03 VITALS — WEIGHT: 175 LBS | BODY MASS INDEX: 27.47 KG/M2 | HEIGHT: 67 IN

## 2025-02-03 PROCEDURE — 20611 DRAIN/INJ JOINT/BURSA W/US: CPT | Mod: 50

## 2025-02-03 RX ORDER — HYALURONATE SODIUM 20 MG/2 ML
20 SYRINGE (ML) INTRAARTICULAR
Refills: 0 | Status: COMPLETED | OUTPATIENT
Start: 2025-02-03

## 2025-02-03 RX ADMIN — Medication MG/2ML: at 00:00

## 2025-02-10 ENCOUNTER — APPOINTMENT (OUTPATIENT)
Dept: ORTHOPEDIC SURGERY | Facility: CLINIC | Age: 80
End: 2025-02-10
Payer: MEDICARE

## 2025-02-10 PROCEDURE — 20611 DRAIN/INJ JOINT/BURSA W/US: CPT | Mod: 50

## 2025-02-10 RX ORDER — HYALURONATE SODIUM 20 MG/2 ML
20 SYRINGE (ML) INTRAARTICULAR
Refills: 0 | Status: COMPLETED | OUTPATIENT
Start: 2025-02-10

## 2025-02-10 RX ADMIN — Medication MG/2ML: at 00:00

## 2025-02-17 ENCOUNTER — APPOINTMENT (OUTPATIENT)
Dept: ORTHOPEDIC SURGERY | Facility: CLINIC | Age: 80
End: 2025-02-17
Payer: MEDICARE

## 2025-02-17 DIAGNOSIS — M17.0 BILATERAL PRIMARY OSTEOARTHRITIS OF KNEE: ICD-10-CM

## 2025-02-17 PROCEDURE — 20611 DRAIN/INJ JOINT/BURSA W/US: CPT | Mod: 50

## 2025-02-17 RX ORDER — HYALURONATE SODIUM 20 MG/2 ML
20 SYRINGE (ML) INTRAARTICULAR
Refills: 0 | Status: COMPLETED | OUTPATIENT
Start: 2025-02-17

## 2025-02-17 RX ADMIN — Medication MG/2ML: at 00:00

## 2025-03-03 ENCOUNTER — OFFICE (OUTPATIENT)
Dept: URBAN - METROPOLITAN AREA CLINIC 12 | Facility: CLINIC | Age: 80
Setting detail: OPHTHALMOLOGY
End: 2025-03-03
Payer: MEDICARE

## 2025-03-03 DIAGNOSIS — H25.11: ICD-10-CM

## 2025-03-03 DIAGNOSIS — H25.13: ICD-10-CM

## 2025-03-03 DIAGNOSIS — H25.12: ICD-10-CM

## 2025-03-03 DIAGNOSIS — H53.001: ICD-10-CM

## 2025-03-03 DIAGNOSIS — H40.033: ICD-10-CM

## 2025-03-03 PROCEDURE — 99204 OFFICE O/P NEW MOD 45 MIN: CPT | Performed by: OPHTHALMOLOGY

## 2025-03-03 ASSESSMENT — REFRACTION_MANIFEST
OD_AXIS: 122
OS_AXIS: 158
OD_CYLINDER: -0.25
OD_VA1: 20/80
OS_VA1: 20/NI
OS_CYLINDER: +0.25
OD_SPHERE: PLANO

## 2025-03-03 ASSESSMENT — VISUAL ACUITY
OD_BCVA: 20/40-1
OS_BCVA: 20/80-1

## 2025-03-03 ASSESSMENT — REFRACTION_AUTOREFRACTION
OS_SPHERE: +23.50
OS_CYLINDER: -1.25
OD_CYLINDER: -0.25
OS_AXIS: 068
OD_AXIS: 122
OD_SPHERE: PLANO

## 2025-03-03 ASSESSMENT — KERATOMETRY
OS_K1POWER_DIOPTERS: 44.50
OS_AXISANGLE_DEGREES: 063
OD_K2POWER_DIOPTERS: 45.00
OD_AXISANGLE_DEGREES: 028
OD_K1POWER_DIOPTERS: 44.50
OS_K2POWER_DIOPTERS: 45.00

## 2025-03-03 ASSESSMENT — CONFRONTATIONAL VISUAL FIELD TEST (CVF)
OS_FINDINGS: FULL
OD_FINDINGS: FULL

## 2025-03-03 ASSESSMENT — REFRACTION_CURRENTRX
OS_AXIS: 158
OD_SPHERE: PLANO
OS_CYLINDER: -0.25
OS_ADD: +2.75
OS_SPHERE: PLANO
OS_OVR_VA: 20/
OD_OVR_VA: 20/
OD_AXIS: 000
OS_VPRISM_DIRECTION: PROGS
OD_VPRISM_DIRECTION: PROGS
OD_CYLINDER: SPHERE
OD_ADD: +2.75

## 2025-03-03 ASSESSMENT — TONOMETRY
OS_IOP_MMHG: 13
OD_IOP_MMHG: 14

## 2025-05-29 ENCOUNTER — OFFICE (OUTPATIENT)
Dept: URBAN - METROPOLITAN AREA CLINIC 12 | Facility: CLINIC | Age: 80
Setting detail: OPHTHALMOLOGY
End: 2025-05-29
Payer: MEDICARE

## 2025-05-29 DIAGNOSIS — H25.11: ICD-10-CM

## 2025-05-29 DIAGNOSIS — H25.13: ICD-10-CM

## 2025-05-29 PROCEDURE — 92136 OPHTHALMIC BIOMETRY: CPT | Mod: TC | Performed by: OPHTHALMOLOGY

## 2025-05-29 PROCEDURE — 92136 OPHTHALMIC BIOMETRY: CPT | Mod: 26,RT | Performed by: OPHTHALMOLOGY

## 2025-05-29 PROCEDURE — 99213 OFFICE O/P EST LOW 20 MIN: CPT | Performed by: OPHTHALMOLOGY

## 2025-05-29 ASSESSMENT — REFRACTION_MANIFEST
OD_CYLINDER: -0.25
OD_VA1: 20/80
OS_CYLINDER: +0.25
OD_SPHERE: PLANO
OS_AXIS: 158
OD_AXIS: 122
OS_VA1: 20/NI

## 2025-05-29 ASSESSMENT — REFRACTION_CURRENTRX
OS_AXIS: 158
OS_OVR_VA: 20/
OD_AXIS: 000
OD_ADD: +2.75
OS_SPHERE: PLANO
OS_CYLINDER: -0.25
OD_OVR_VA: 20/
OS_ADD: +2.75
OD_VPRISM_DIRECTION: PROGS
OD_SPHERE: PLANO
OD_CYLINDER: SPHERE
OS_VPRISM_DIRECTION: PROGS

## 2025-05-29 ASSESSMENT — REFRACTION_AUTOREFRACTION
OS_AXIS: 061
OS_CYLINDER: -2.50
OS_SPHERE: -1.25
OD_AXIS: 031
OD_CYLINDER: -0.25
OD_SPHERE: +0.50

## 2025-05-29 ASSESSMENT — KERATOMETRY
OD_AXISANGLE_DEGREES: 045
OS_K2POWER_DIOPTERS: 44.75
OD_K1POWER_DIOPTERS: 44.25
OS_K1POWER_DIOPTERS: 44.50
OS_AXISANGLE_DEGREES: 122
OD_K2POWER_DIOPTERS: 44.75

## 2025-05-29 ASSESSMENT — VISUAL ACUITY
OD_BCVA: 20/40-1
OS_BCVA: 20/60-2

## 2025-05-29 ASSESSMENT — CONFRONTATIONAL VISUAL FIELD TEST (CVF)
OD_FINDINGS: FULL
OS_FINDINGS: FULL

## 2025-05-29 ASSESSMENT — TONOMETRY
OD_IOP_MMHG: 17
OS_IOP_MMHG: 16

## 2025-05-29 ASSESSMENT — CORNEAL DYSTROPHY - POSTERIOR
OS_POSTERIORDYSTROPHY: FUCHS
OD_POSTERIORDYSTROPHY: FUCHS

## 2025-05-30 ENCOUNTER — OFFICE (OUTPATIENT)
Dept: URBAN - METROPOLITAN AREA CLINIC 12 | Facility: CLINIC | Age: 80
Setting detail: OPHTHALMOLOGY
End: 2025-05-30
Payer: MEDICARE

## 2025-05-30 DIAGNOSIS — Z01.818: ICD-10-CM

## 2025-05-30 DIAGNOSIS — H25.13: ICD-10-CM

## 2025-05-30 PROBLEM — H18.513 ENDOTHELIAL CORNEAL DYSTROPHY; BOTH EYES: Status: ACTIVE | Noted: 2025-05-29

## 2025-05-30 PROCEDURE — 99212 OFFICE O/P EST SF 10 MIN: CPT

## 2025-06-06 ENCOUNTER — ASC (OUTPATIENT)
Dept: URBAN - METROPOLITAN AREA SURGERY 8 | Facility: SURGERY | Age: 80
Setting detail: OPHTHALMOLOGY
End: 2025-06-06
Payer: MEDICARE

## 2025-06-06 DIAGNOSIS — H52.221: ICD-10-CM

## 2025-06-06 DIAGNOSIS — H25.11: ICD-10-CM

## 2025-06-06 DIAGNOSIS — H57.03: ICD-10-CM

## 2025-06-06 PROCEDURE — 68841 INSJ RX ELUT IMPLT LAC CANAL: CPT | Mod: RT | Performed by: OPHTHALMOLOGY

## 2025-06-06 PROCEDURE — 66982 XCAPSL CTRC RMVL CPLX WO ECP: CPT | Mod: RT | Performed by: OPHTHALMOLOGY

## 2025-06-06 PROCEDURE — S9986 NOT MEDICALLY NECESSARY SVC: HCPCS | Mod: GX,GY | Performed by: OPHTHALMOLOGY

## 2025-06-06 PROCEDURE — FEMTO PRECISION LASER CATARACT SURGERY: Mod: GY | Performed by: OPHTHALMOLOGY

## 2025-06-07 ENCOUNTER — RX ONLY (RX ONLY)
Age: 80
End: 2025-06-07

## 2025-06-07 ENCOUNTER — OFFICE (OUTPATIENT)
Dept: URBAN - METROPOLITAN AREA CLINIC 12 | Facility: CLINIC | Age: 80
Setting detail: OPHTHALMOLOGY
End: 2025-06-07
Payer: MEDICARE

## 2025-06-07 DIAGNOSIS — Z96.1: ICD-10-CM

## 2025-06-07 PROCEDURE — 99024 POSTOP FOLLOW-UP VISIT: CPT | Performed by: OPTOMETRIST

## 2025-06-07 ASSESSMENT — CORNEAL DYSTROPHY - POSTERIOR
OS_POSTERIORDYSTROPHY: FUCHS
OD_POSTERIORDYSTROPHY: FUCHS

## 2025-06-07 ASSESSMENT — REFRACTION_AUTOREFRACTION
OD_CYLINDER: -2.25
OD_SPHERE: -0.75
OS_AXIS: 019
OS_CYLINDER: -1.00
OS_SPHERE: -0.75
OD_AXIS: 165

## 2025-06-07 ASSESSMENT — CONFRONTATIONAL VISUAL FIELD TEST (CVF)
OD_FINDINGS: FULL
OS_FINDINGS: FULL

## 2025-06-07 ASSESSMENT — VISUAL ACUITY
OS_BCVA: 20/60-2
OD_BCVA: 20/30+2

## 2025-06-07 ASSESSMENT — KERATOMETRY
OD_K2POWER_DIOPTERS: 46.25
OS_K1POWER_DIOPTERS: 44.50
OD_AXISANGLE_DEGREES: 138
OD_K1POWER_DIOPTERS: 44.00
OS_K2POWER_DIOPTERS: 45.00
OS_AXISANGLE_DEGREES: 095

## 2025-06-07 ASSESSMENT — TONOMETRY: OS_IOP_MMHG: 16

## 2025-06-08 ENCOUNTER — OFFICE (OUTPATIENT)
Dept: URBAN - METROPOLITAN AREA CLINIC 12 | Facility: CLINIC | Age: 80
Setting detail: OPHTHALMOLOGY
End: 2025-06-08
Payer: MEDICARE

## 2025-06-08 DIAGNOSIS — Z96.1: ICD-10-CM

## 2025-06-08 PROCEDURE — 99024 POSTOP FOLLOW-UP VISIT: CPT | Performed by: OPTOMETRIST

## 2025-06-08 ASSESSMENT — CONFRONTATIONAL VISUAL FIELD TEST (CVF)
OS_FINDINGS: FULL
OD_FINDINGS: FULL

## 2025-06-08 ASSESSMENT — KERATOMETRY
OD_K1POWER_DIOPTERS: 44.00
OD_AXISANGLE_DEGREES: 020
OS_K1POWER_DIOPTERS: 44.75
OS_AXISANGLE_DEGREES: 90
OS_K2POWER_DIOPTERS: 44.75
OD_K2POWER_DIOPTERS: 45.75
METHOD_AUTO_MANUAL: AUTO

## 2025-06-08 ASSESSMENT — REFRACTION_AUTOREFRACTION
OD_SPHERE: -0.25
OS_SPHERE: +25.00
OD_AXIS: 109
OS_AXIS: 000
OS_CYLINDER: SP
OD_CYLINDER: -1.25

## 2025-06-08 ASSESSMENT — CORNEAL DYSTROPHY - POSTERIOR
OS_POSTERIORDYSTROPHY: FUCHS
OD_POSTERIORDYSTROPHY: FUCHS

## 2025-06-08 ASSESSMENT — VISUAL ACUITY
OD_BCVA: 20/40
OS_BCVA: 20/60-2

## 2025-06-08 ASSESSMENT — CORNEAL EDEMA CLINICAL DESCRIPTION: OD_CORNEALEDEMA: 1+

## 2025-06-08 ASSESSMENT — TONOMETRY: OS_IOP_MMHG: 11

## 2025-06-11 ENCOUNTER — OFFICE (OUTPATIENT)
Dept: URBAN - METROPOLITAN AREA CLINIC 12 | Facility: CLINIC | Age: 80
Setting detail: OPHTHALMOLOGY
End: 2025-06-11
Payer: MEDICARE

## 2025-06-11 DIAGNOSIS — H25.12: ICD-10-CM

## 2025-06-11 PROCEDURE — 92136 OPHTHALMIC BIOMETRY: CPT | Mod: 26,LT | Performed by: OPHTHALMOLOGY

## 2025-06-11 ASSESSMENT — VISUAL ACUITY
OS_BCVA: 20/30-1
OD_BCVA: 20/60-1

## 2025-06-11 ASSESSMENT — KERATOMETRY
OD_K2POWER_DIOPTERS: 45.50
OS_K2POWER_DIOPTERS: 44.50
OS_AXISANGLE_DEGREES: 142
METHOD_AUTO_MANUAL: AUTO
OD_K1POWER_DIOPTERS: 44.25
OS_K1POWER_DIOPTERS: 44.25
OD_AXISANGLE_DEGREES: 064

## 2025-06-11 ASSESSMENT — REFRACTION_AUTOREFRACTION
OS_CYLINDER: -0.25
OS_AXIS: 015
OD_SPHERE: 0.00
OS_SPHERE: -0.75
OD_CYLINDER: -1.00
OD_AXIS: 124

## 2025-06-11 ASSESSMENT — CONFRONTATIONAL VISUAL FIELD TEST (CVF)
OD_FINDINGS: FULL
OS_FINDINGS: FULL

## 2025-06-11 ASSESSMENT — TONOMETRY
OS_IOP_MMHG: 15
OD_IOP_MMHG: 17

## 2025-06-11 ASSESSMENT — CORNEAL DYSTROPHY - POSTERIOR
OD_POSTERIORDYSTROPHY: FUCHS
OS_POSTERIORDYSTROPHY: FUCHS

## 2025-06-17 ENCOUNTER — ASC (OUTPATIENT)
Dept: URBAN - METROPOLITAN AREA SURGERY 8 | Facility: SURGERY | Age: 80
Setting detail: OPHTHALMOLOGY
End: 2025-06-17
Payer: MEDICARE

## 2025-06-17 DIAGNOSIS — H52.222: ICD-10-CM

## 2025-06-17 DIAGNOSIS — H25.12: ICD-10-CM

## 2025-06-17 DIAGNOSIS — H57.03: ICD-10-CM

## 2025-06-17 PROCEDURE — FEMTO PRECISION LASER CATARACT SURGERY: Mod: GY | Performed by: OPHTHALMOLOGY

## 2025-06-17 PROCEDURE — S9986 NOT MEDICALLY NECESSARY SVC: HCPCS | Mod: GX,GY | Performed by: OPHTHALMOLOGY

## 2025-06-17 PROCEDURE — 68841 INSJ RX ELUT IMPLT LAC CANAL: CPT | Mod: 79,LT | Performed by: OPHTHALMOLOGY

## 2025-06-17 PROCEDURE — 66982 XCAPSL CTRC RMVL CPLX WO ECP: CPT | Mod: 79,LT | Performed by: OPHTHALMOLOGY

## 2025-06-18 ENCOUNTER — OFFICE (OUTPATIENT)
Dept: URBAN - METROPOLITAN AREA CLINIC 12 | Facility: CLINIC | Age: 80
Setting detail: OPHTHALMOLOGY
End: 2025-06-18
Payer: MEDICARE

## 2025-06-18 DIAGNOSIS — Z96.1: ICD-10-CM

## 2025-06-18 PROCEDURE — 99024 POSTOP FOLLOW-UP VISIT: CPT | Performed by: OPTOMETRIST

## 2025-06-18 ASSESSMENT — VISUAL ACUITY
OS_BCVA: 20/50-2
OD_BCVA: 20/100-

## 2025-06-18 ASSESSMENT — CORNEAL DYSTROPHY - POSTERIOR
OS_POSTERIORDYSTROPHY: FUCHS
OD_POSTERIORDYSTROPHY: FUCHS

## 2025-06-18 ASSESSMENT — KERATOMETRY
OS_AXISANGLE_DEGREES: 118
OS_K2POWER_DIOPTERS: 46.75
OS_K1POWER_DIOPTERS: 45.50
OD_AXISANGLE_DEGREES: 024
OD_K2POWER_DIOPTERS: 45.00
OD_K1POWER_DIOPTERS: 44.75
METHOD_AUTO_MANUAL: AUTO

## 2025-06-18 ASSESSMENT — REFRACTION_AUTOREFRACTION
OD_AXIS: 122
OS_AXIS: 012
OS_SPHERE: -0.25
OD_CYLINDER: -1.00
OD_SPHERE: PLANO
OS_CYLINDER: -1.75

## 2025-06-18 ASSESSMENT — CONFRONTATIONAL VISUAL FIELD TEST (CVF)
OD_FINDINGS: FULL
OS_FINDINGS: FULL

## 2025-06-18 ASSESSMENT — TONOMETRY: OD_IOP_MMHG: 20

## 2025-06-19 ENCOUNTER — OFFICE (OUTPATIENT)
Dept: URBAN - METROPOLITAN AREA CLINIC 12 | Facility: CLINIC | Age: 80
Setting detail: OPHTHALMOLOGY
End: 2025-06-19
Payer: MEDICARE

## 2025-06-19 DIAGNOSIS — Z96.1: ICD-10-CM

## 2025-06-19 PROCEDURE — 99024 POSTOP FOLLOW-UP VISIT: CPT | Performed by: OPHTHALMOLOGY

## 2025-06-19 ASSESSMENT — REFRACTION_AUTOREFRACTION
OS_SPHERE: +0.25
OD_AXIS: 116
OD_CYLINDER: -1.25
OS_AXIS: 049
OS_CYLINDER: -1.50
OD_SPHERE: +0.25

## 2025-06-19 ASSESSMENT — CONFRONTATIONAL VISUAL FIELD TEST (CVF)
OD_FINDINGS: FULL
OS_FINDINGS: FULL

## 2025-06-19 ASSESSMENT — VISUAL ACUITY
OS_BCVA: 20/100
OD_BCVA: 20/50-2

## 2025-06-19 ASSESSMENT — KERATOMETRY
METHOD_AUTO_MANUAL: AUTO
OD_K2POWER_DIOPTERS: 45.25
OS_K1POWER_DIOPTERS: 44.50
OS_K2POWER_DIOPTERS: 45.00
OD_K1POWER_DIOPTERS: 45.00
OD_AXISANGLE_DEGREES: 033
OS_AXISANGLE_DEGREES: 125

## 2025-06-19 ASSESSMENT — TONOMETRY
OD_IOP_MMHG: 15
OS_IOP_MMHG: 21

## 2025-06-19 ASSESSMENT — CORNEAL DYSTROPHY - POSTERIOR
OD_POSTERIORDYSTROPHY: FUCHS
OS_POSTERIORDYSTROPHY: FUCHS

## 2025-06-23 ENCOUNTER — OFFICE (OUTPATIENT)
Dept: URBAN - METROPOLITAN AREA CLINIC 12 | Facility: CLINIC | Age: 80
Setting detail: OPHTHALMOLOGY
End: 2025-06-23
Payer: MEDICARE

## 2025-06-23 DIAGNOSIS — Z96.1: ICD-10-CM

## 2025-06-23 DIAGNOSIS — H40.042: ICD-10-CM

## 2025-06-23 PROCEDURE — 99024 POSTOP FOLLOW-UP VISIT: CPT | Performed by: OPTOMETRIST

## 2025-06-23 ASSESSMENT — REFRACTION_AUTOREFRACTION
OD_CYLINDER: -1.00
OS_CYLINDER: -1.00
OS_AXIS: 080
OD_SPHERE: +0.25
OD_AXIS: 111
OS_SPHERE: +0.75

## 2025-06-23 ASSESSMENT — KERATOMETRY
OD_K1POWER_DIOPTERS: 44.75
OS_K2POWER_DIOPTERS: 44.50
OD_K2POWER_DIOPTERS: 45.00
OD_AXISANGLE_DEGREES: 031
OS_AXISANGLE_DEGREES: 090
METHOD_AUTO_MANUAL: AUTO
OS_K1POWER_DIOPTERS: 44.50

## 2025-06-23 ASSESSMENT — CORNEAL DYSTROPHY - POSTERIOR
OD_POSTERIORDYSTROPHY: FUCHS
OS_POSTERIORDYSTROPHY: FUCHS

## 2025-06-23 ASSESSMENT — REFRACTION_CURRENTRX
OD_VPRISM_DIRECTION: SV
OS_OVR_VA: 20/
OD_ADD: +2.50
OS_ADD: +2.50
OS_VPRISM_DIRECTION: SV
OD_OVR_VA: 20/

## 2025-06-23 ASSESSMENT — CONFRONTATIONAL VISUAL FIELD TEST (CVF)
OD_FINDINGS: FULL
OS_FINDINGS: FULL

## 2025-06-23 ASSESSMENT — TONOMETRY: OD_IOP_MMHG: 12

## 2025-06-23 ASSESSMENT — VISUAL ACUITY
OD_BCVA: 20/30+3
OS_BCVA: 20/80+2

## 2025-07-02 ENCOUNTER — RX ONLY (RX ONLY)
Age: 80
End: 2025-07-02

## 2025-07-02 ENCOUNTER — OFFICE (OUTPATIENT)
Dept: URBAN - METROPOLITAN AREA CLINIC 12 | Facility: CLINIC | Age: 80
Setting detail: OPHTHALMOLOGY
End: 2025-07-02
Payer: MEDICARE

## 2025-07-02 DIAGNOSIS — Z96.1: ICD-10-CM

## 2025-07-02 DIAGNOSIS — H40.042: ICD-10-CM

## 2025-07-02 PROCEDURE — 99024 POSTOP FOLLOW-UP VISIT: CPT | Performed by: OPTOMETRIST

## 2025-07-02 ASSESSMENT — TONOMETRY
OD_IOP_MMHG: 13
OS_IOP_MMHG: 12

## 2025-07-02 ASSESSMENT — KERATOMETRY
OS_K1POWER_DIOPTERS: 44.75
OD_K1POWER_DIOPTERS: 44.75
OD_K2POWER_DIOPTERS: 45.00
OS_AXISANGLE_DEGREES: 158
OD_AXISANGLE_DEGREES: 047
METHOD_AUTO_MANUAL: AUTO
OS_K2POWER_DIOPTERS: 45.00

## 2025-07-02 ASSESSMENT — VISUAL ACUITY
OS_BCVA: 20/70+1
OD_BCVA: 20/30+1

## 2025-07-02 ASSESSMENT — CORNEAL DYSTROPHY - POSTERIOR
OS_POSTERIORDYSTROPHY: FUCHS
OD_POSTERIORDYSTROPHY: FUCHS

## 2025-07-02 ASSESSMENT — REFRACTION_CURRENTRX
OD_OVR_VA: 20/
OS_OVR_VA: 20/
OD_ADD: +2.50
OD_VPRISM_DIRECTION: SV
OS_VPRISM_DIRECTION: SV
OS_ADD: +2.50

## 2025-07-02 ASSESSMENT — REFRACTION_AUTOREFRACTION
OS_CYLINDER: -0.75
OD_SPHERE: +0.25
OS_AXIS: 074
OD_CYLINDER: -1.25
OD_AXIS: 111
OS_SPHERE: +0.25

## 2025-07-02 ASSESSMENT — CONFRONTATIONAL VISUAL FIELD TEST (CVF)
OS_FINDINGS: FULL
OD_FINDINGS: FULL

## 2025-07-16 ENCOUNTER — OFFICE (OUTPATIENT)
Dept: URBAN - METROPOLITAN AREA CLINIC 12 | Facility: CLINIC | Age: 80
Setting detail: OPHTHALMOLOGY
End: 2025-07-16
Payer: MEDICARE

## 2025-07-16 DIAGNOSIS — Z96.1: ICD-10-CM

## 2025-07-16 DIAGNOSIS — H40.042: ICD-10-CM

## 2025-07-16 DIAGNOSIS — H52.01: ICD-10-CM

## 2025-07-16 DIAGNOSIS — H52.7: ICD-10-CM

## 2025-07-16 PROCEDURE — 99024 POSTOP FOLLOW-UP VISIT: CPT | Performed by: OPHTHALMOLOGY

## 2025-07-16 PROCEDURE — 92015 DETERMINE REFRACTIVE STATE: CPT | Performed by: OPHTHALMOLOGY

## 2025-07-16 ASSESSMENT — REFRACTION_CURRENTRX
OS_OVR_VA: 20/
OS_ADD: +2.50
OS_VPRISM_DIRECTION: SV
OD_ADD: +2.50
OD_VPRISM_DIRECTION: SV
OD_OVR_VA: 20/

## 2025-07-16 ASSESSMENT — REFRACTION_AUTOREFRACTION
OD_AXIS: 111
OS_AXIS: 068
OS_SPHERE: +0.25
OS_CYLINDER: -1.00
OD_SPHERE: PLANO
OD_CYLINDER: -1.75

## 2025-07-16 ASSESSMENT — REFRACTION_MANIFEST
OS_ADD: +2.50
OS_CYLINDER: -1.00
OS_SPHERE: PLANO
OD_CYLINDER: -1.25
OD_AXIS: 110
OD_SPHERE: PLANO
OS_AXIS: 070
OD_ADD: +2.50

## 2025-07-16 ASSESSMENT — KERATOMETRY
OD_K2POWER_DIOPTERS: 45.25
OS_AXISANGLE_DEGREES: 130
OD_AXISANGLE_DEGREES: 028
OD_K1POWER_DIOPTERS: 45.00
OS_K2POWER_DIOPTERS: 45.25
METHOD_AUTO_MANUAL: AUTO
OS_K1POWER_DIOPTERS: 44.75

## 2025-07-16 ASSESSMENT — TONOMETRY
OS_IOP_MMHG: 11
OD_IOP_MMHG: 15

## 2025-07-16 ASSESSMENT — VISUAL ACUITY
OS_BCVA: 20/60-2
OD_BCVA: 20/50

## 2025-07-16 ASSESSMENT — CORNEAL DYSTROPHY - POSTERIOR
OD_POSTERIORDYSTROPHY: FUCHS
OS_POSTERIORDYSTROPHY: FUCHS

## 2025-07-16 ASSESSMENT — CONFRONTATIONAL VISUAL FIELD TEST (CVF)
OS_FINDINGS: FULL
OD_FINDINGS: FULL

## 2025-08-03 ENCOUNTER — OFFICE (OUTPATIENT)
Dept: URBAN - METROPOLITAN AREA CLINIC 12 | Facility: CLINIC | Age: 80
Setting detail: OPHTHALMOLOGY
End: 2025-08-03
Payer: MEDICARE

## 2025-08-03 DIAGNOSIS — H40.042: ICD-10-CM

## 2025-08-03 DIAGNOSIS — Z96.1: ICD-10-CM

## 2025-08-03 DIAGNOSIS — H53.001: ICD-10-CM

## 2025-08-03 DIAGNOSIS — H52.7: ICD-10-CM

## 2025-08-03 DIAGNOSIS — H59.032: ICD-10-CM

## 2025-08-03 PROCEDURE — 99024 POSTOP FOLLOW-UP VISIT: CPT | Performed by: OPHTHALMOLOGY

## 2025-08-03 ASSESSMENT — REFRACTION_MANIFEST
OD_VA1: 20/50
OD_CYLINDER: -1.00
OS_CYLINDER: -1.00
OS_VA1: 20/40
OS_AXIS: 065
OD_CYLINDER: -1.00
OS_AXIS: 070
OS_VA1: 20/50+2
OS_SPHERE: -0.25
OS_SPHERE: PLANO
OD_AXIS: 110
OD_SPHERE: +0.25
OD_ADD: +2.50
OD_SPHERE: PLANO
OD_VA1: 20/NI
OS_CYLINDER: -0.75
OS_ADD: +2.50
OD_AXIS: 110

## 2025-08-03 ASSESSMENT — VISUAL ACUITY
OS_BCVA: 20/60+2
OD_BCVA: 20/50-

## 2025-08-03 ASSESSMENT — CORNEAL DYSTROPHY - POSTERIOR
OS_POSTERIORDYSTROPHY: FUCHS
OD_POSTERIORDYSTROPHY: FUCHS

## 2025-08-03 ASSESSMENT — KERATOMETRY
OD_AXISANGLE_DEGREES: 090
OD_K1POWER_DIOPTERS: 45.00
OS_K1POWER_DIOPTERS: 44.75
OD_K2POWER_DIOPTERS: 45.00
OS_K2POWER_DIOPTERS: 45.25
OS_AXISANGLE_DEGREES: 147
METHOD_AUTO_MANUAL: AUTO

## 2025-08-03 ASSESSMENT — REFRACTION_CURRENTRX
OS_AXIS: 065
OS_ADD: +2.50
OD_CYLINDER: -1.00
OD_SPHERE: PLANO
OS_SPHERE: PLANO
OS_CYLINDER: -1.00
OS_VPRISM_DIRECTION: PROGS
OS_OVR_VA: 20/
OD_AXIS: 110
OS_ADD: +2.25
OS_VPRISM_DIRECTION: SV
OD_VPRISM_DIRECTION: PROGS
OD_VPRISM_DIRECTION: SV
OD_ADD: +2.50
OD_OVR_VA: 20/
OS_OVR_VA: 20/
OD_ADD: +2.25
OD_OVR_VA: 20/

## 2025-08-03 ASSESSMENT — CONFRONTATIONAL VISUAL FIELD TEST (CVF)
OS_FINDINGS: FULL
OD_FINDINGS: FULL

## 2025-08-03 ASSESSMENT — REFRACTION_AUTOREFRACTION
OS_AXIS: 067
OS_CYLINDER: -0.75
OS_SPHERE: -0.25
OD_SPHERE: +0.25
OD_CYLINDER: -1.00
OD_AXIS: 109

## 2025-08-03 ASSESSMENT — TONOMETRY
OD_IOP_MMHG: 12
OS_IOP_MMHG: 10

## 2025-08-04 ENCOUNTER — RX ONLY (RX ONLY)
Age: 80
End: 2025-08-04

## 2025-08-04 ENCOUNTER — OFFICE (OUTPATIENT)
Dept: URBAN - METROPOLITAN AREA CLINIC 88 | Facility: CLINIC | Age: 80
Setting detail: OPHTHALMOLOGY
End: 2025-08-04
Payer: MEDICARE

## 2025-08-04 DIAGNOSIS — H43.813: ICD-10-CM

## 2025-08-04 DIAGNOSIS — H59.032: ICD-10-CM

## 2025-08-04 PROCEDURE — 92012 INTRM OPH EXAM EST PATIENT: CPT | Performed by: OPHTHALMOLOGY

## 2025-08-04 PROCEDURE — 92134 CPTRZ OPH DX IMG PST SGM RTA: CPT | Performed by: OPHTHALMOLOGY

## 2025-08-04 ASSESSMENT — TONOMETRY
OS_IOP_MMHG: 11
OD_IOP_MMHG: 13

## 2025-08-04 ASSESSMENT — KERATOMETRY
OS_AXISANGLE_DEGREES: 147
OS_K1POWER_DIOPTERS: 44.75
OS_K2POWER_DIOPTERS: 45.25
OD_K2POWER_DIOPTERS: 45.00
OD_AXISANGLE_DEGREES: 090
OD_K1POWER_DIOPTERS: 45.00
METHOD_AUTO_MANUAL: AUTO

## 2025-08-04 ASSESSMENT — REFRACTION_AUTOREFRACTION
OS_CYLINDER: -0.75
OS_SPHERE: -0.25
OD_AXIS: 109
OS_AXIS: 067
OD_CYLINDER: -1.00
OD_SPHERE: +0.25

## 2025-08-04 ASSESSMENT — VISUAL ACUITY
OS_BCVA: 20/50
OD_BCVA: 20/80

## 2025-08-04 ASSESSMENT — CORNEAL DYSTROPHY - POSTERIOR
OD_POSTERIORDYSTROPHY: FUCHS
OS_POSTERIORDYSTROPHY: FUCHS

## 2025-08-04 ASSESSMENT — CONFRONTATIONAL VISUAL FIELD TEST (CVF)
OS_FINDINGS: FULL
OD_FINDINGS: FULL